# Patient Record
Sex: FEMALE | Race: WHITE
[De-identification: names, ages, dates, MRNs, and addresses within clinical notes are randomized per-mention and may not be internally consistent; named-entity substitution may affect disease eponyms.]

---

## 2018-01-14 ENCOUNTER — HOSPITAL ENCOUNTER (EMERGENCY)
Dept: HOSPITAL 56 - MW.ED | Age: 48
Discharge: HOME | End: 2018-01-14
Payer: COMMERCIAL

## 2018-01-14 DIAGNOSIS — Z79.82: ICD-10-CM

## 2018-01-14 DIAGNOSIS — F17.210: ICD-10-CM

## 2018-01-14 DIAGNOSIS — I82.402: Primary | ICD-10-CM

## 2018-01-14 DIAGNOSIS — E11.9: ICD-10-CM

## 2018-01-14 DIAGNOSIS — Z88.8: ICD-10-CM

## 2018-01-14 LAB
CHLORIDE SERPL-SCNC: 104 MMOL/L (ref 98–110)
SODIUM SERPL-SCNC: 140 MMOL/L (ref 136–146)

## 2018-01-14 PROCEDURE — 85025 COMPLETE CBC W/AUTO DIFF WBC: CPT

## 2018-01-14 PROCEDURE — 99283 EMERGENCY DEPT VISIT LOW MDM: CPT

## 2018-01-14 PROCEDURE — 93971 EXTREMITY STUDY: CPT

## 2018-01-14 PROCEDURE — 85610 PROTHROMBIN TIME: CPT

## 2018-01-14 PROCEDURE — 80053 COMPREHEN METABOLIC PANEL: CPT

## 2018-01-14 PROCEDURE — 36415 COLL VENOUS BLD VENIPUNCTURE: CPT

## 2018-01-14 NOTE — EDM.PDOC
ED HPI GENERAL MEDICAL PROBLEM





- General


Chief Complaint: Lower Extremity Injury/Pain


Stated Complaint: POSSIBLE BLOT CLOT IN LT LEG


Time Seen by Provider: 18 15:45


Source of Information: Reports: Patient


History Limitations: Reports: No Limitations





- History of Present Illness


INITIAL COMMENTS - FREE TEXT/NARRATIVE: 


HISTORY AND PHYSICAL:





History of present illness:


[Patient comes to the emergency room complaining of left lower extremity pain. 

Left calf and lower leg have been feeling tight for the past 5 days but pain 

and swelling began this morning. She complains of warmth and pain to her left 

calf. Describes it as an aching feeling with some sharp shooting pains. Has a 

history of type 2 diabetes for several years. Stopped all of her medications 

about 6 months ago. Smokes one pack of cigarettes per day. No history of 

bleeding or clotting disorders.


No fever or chills. No pain in her right lower extremity. No chest pain 

shortness of breath or difficulty breathing. She has no other complaints or 

concerns at this time. ]





Review of systems: 


As per history of present illness and below otherwise all systems reviewed and 

negative.





Past medical history: 


As per history of present illness and as reviewed below otherwise 

noncontributory.





Surgical history: 


As per history of present illness and as reviewed below otherwise 

noncontributory.





Social history: 


No reported history of drug or alcohol abuse.





Family history: 


As per history of present illness and as reviewed below otherwise 

noncontributory.





Physical exam:


HEENT: Atraumatic, normocephalic.


Lungs: Clear to auscultation, breath sounds equal bilaterally.


Heart: S1S2, regular rate and rhythm.


Extremities: Positive Homans on the left. Tender with palpation behind left 

knee over the popliteal space. Calf is tender with palpation. No cords are 

palpated. Swelling is apparent to left ankle and foot. Cap refill less than 2 

seconds. Neurovascular unremarkable.


Neuro: Awake, alert, oriented.  Motor and sensory unremarkable throughout. Exam 

nonfocal.





Diagnostics:


[CBC, CMP, venous Doppler ultrasound left lower extremity]





Therapeutics:


[Xarelto 15mg po x1]





Impression: 


[DVT Left LE]





Plan:


[Discussed with patient that she has a DVT to her left lower extremity. Urged 

her to quit smoking. Stop aspirin. She may use Tylenol for discomfort. Keep leg 

elevated. She is given her first dose of Xarelto 15 mg in the emergency room 

and sent home with an Rx for xarelto 15mg (#42) si po BID x 21 days 0 RF's. 

Urged patient to follow-up with PCP in the next 48 hours. She verbalized 

understanding of today's discussion and is in agreement with today's plan.]





Definitive disposition and diagnosis as appropriate pending reevaluation and 

review of above.








  ** left lower leg


Pain Score (Numeric/FACES): 8





- Related Data


 Allergies











Allergy/AdvReac Type Severity Reaction Status Date / Time


 


diphenhydramine Allergy  Anaphylactic Verified 18 14:54





[From Benadryl]   Shock  











Home Meds: 


 Home Meds





Aspirin 81 mg PO DAILY 18 [History]











Past Medical History


HEENT History: Reports: None


Cardiovascular History: Reports: None


Respiratory History: Reports: None


Gastrointestinal History: Reports: None


Genitourinary History: Reports: None


OB/GYN History: Reports: None


Musculoskeletal History: Reports: None


Neurological History: Reports: None


Psychiatric History: Reports: None


Endocrine/Metabolic History: Reports: Diabetes, Type II


Hematologic History: Reports: None


Immunologic History: Reports: None


Oncologic (Cancer) History: Reports: None


Dermatologic History: Reports: None





- Past Surgical History


Head Surgeries/Procedures: Reports: None


HEENT Surgical History: Reports: None


Cardiovascular Surgical History: Reports: None


Respiratory Surgical History: Reports: None


GI Surgical History: Reports: None


Female  Surgical History: Reports: Breast Reduction


Endocrine Surgical History: Reports: None


Neurological Surgical History: Reports: None


Musculoskeletal Surgical History: Reports: Other (See Below)


Oncologic Surgical History: Reports: None


Dermatological Surgical History: Reports: None





Social & Family History





- Family History


Family Medical History: Noncontributory





- Tobacco Use


Smoking Status *Q: Current Every Day Smoker


Years of Tobacco use: 35


Packs/Tins Daily: 1





- Caffeine Use


Caffeine Use: Reports: None





- Recreational Drug Use


Recreational Drug Use: No





Review of Systems





- Review of Systems


Review Of Systems: ROS reveals no pertinent complaints other than HPI.





ED EXAM, GENERAL





- Physical Exam


Exam: See Below





Course





- Vital Signs


Last Recorded V/S: 


 Last Vital Signs











Temp  96.8 F   18 14:55


 


Pulse  82   18 18:15


 


Resp  18   18 18:15


 


BP  175/93 H  18 18:15


 


Pulse Ox  96   18 18:15














- Orders/Labs/Meds


Orders: 


 Active Orders 24 hr











 Category Date Time Status


 


 Venous Doppler Lwr Ext Lt [US] Stat Exams  18 15:47 Taken











Labs: 


 Laboratory Tests











  18 Range/Units





  15:53 15:53 15:53 


 


WBC  6.89    (4.0-11.0)  K/uL


 


RBC  4.84    (4.30-5.90)  M/uL


 


Hgb  16.2 H    (12.0-16.0)  g/dL


 


Hct  46.0    (36.0-46.0)  %


 


MCV  95.0    (80.0-98.0)  fL


 


MCH  33.5 H    (27.0-32.0)  pg


 


MCHC  35.2    (31.0-37.0)  g/dL


 


RDW Std Deviation  43.3    (28.0-62.0)  fl


 


RDW Coeff of Kristi  13    (11.0-15.0)  %


 


Plt Count  191    (150-400)  K/uL


 


MPV  9.50    (7.40-12.00)  fL


 


Neut % (Auto)  73.0    (48.0-80.0)  %


 


Lymph % (Auto)  17.7    (16.0-40.0)  %


 


Mono % (Auto)  7.0    (0.0-15.0)  %


 


Eos % (Auto)  2.2    (0.0-7.0)  %


 


Baso % (Auto)  0.1    (0.0-1.5)  %


 


Neut # (Auto)  5.0    (1.4-5.7)  K/uL


 


Lymph # (Auto)  1.2    (0.6-2.4)  K/uL


 


Mono # (Auto)  0.5    (0.0-0.8)  K/uL


 


Eos # (Auto)  0.2    (0.0-0.7)  K/uL


 


Baso # (Auto)  0.0    (0.0-0.1)  K/uL


 


Nucleated RBC %  0.0    /100WBC


 


Nucleated RBCs #  0    K/uL


 


INR    0.99  (0.86-1.11)  


 


Sodium   140   (136-146)  mmol/L


 


Potassium   4.2   (3.5-5.1)  mmol/L


 


Chloride   104   ()  mmol/L


 


Carbon Dioxide   23   (21-31)  mmol/L


 


BUN   7   (6.0-23.0)  mg/dL


 


Creatinine   0.8   (0.6-1.5)  mg/dL


 


Est Cr Clr Drug Dosing   84.54   mL/min


 


Estimated GFR (MDRD)   > 60.0   ml/min


 


Glucose   296 H   ()  mg/dL


 


Calcium   9.4   (8.8-10.8)  mg/dL


 


Total Bilirubin   0.5   (0.1-1.5)  mg/dL


 


AST   52 H   (5-40)  IU/L


 


ALT   26   (8-54)  IU/L


 


Alkaline Phosphatase   114   ()  


 


Total Protein   7.5   (6.0-8.0)  g/dL


 


Albumin   4.2   (3.5-5.0)  g/dL


 


Globulin   3.3   (2.0-3.5)  g/dL


 


Albumin/Globulin Ratio   1.3   (1.3-2.8)  











Meds: 


Medications














Discontinued Medications














Generic Name Dose Route Start Last Admin





  Trade Name Simon  PRN Reason Stop Dose Admin


 


Rivaroxaban  15 mg  01/15/18 17:30  18 18:12





  Xarelto  PO   15 mg





  WITHDINNER DAVID   Administration


 


Rivaroxaban  Confirm  18 17:48  18 18:12





  Xarelto  Administered  18 17:49  Not Given





  Dose   





  15 mg   





  .ROUTE   





  .STK-MED ONE   














Departure





- Departure


Time of Disposition: 17:45


Disposition: Home, Self-Care 01


Condition: Good


Clinical Impression: 


 DVT (deep venous thrombosis)








- Discharge Information


Instructions:  Deep Vein Thrombosis


Referrals: 


PCP,None [Primary Care Provider] - 


Alessandro Espinoza MD [Resident] - 


Forms:  ED Department Discharge


Additional Instructions: 


The following information is given to patients seen in the emergency department 

who are being discharged to home. This information is to outline your options 

for follow-up care. We provide all patients seen in our emergency department 

with a follow-up referral.





The need for follow-up, as well as the timing and circumstances, are variable 

depending upon the specifics of your emergency department visit.





If you don't have a primary care physician on staff, we will provide you with a 

referral. We always advise you to contact your personal physician following an 

emergency department visit to inform them of the circumstance of the visit and 

for follow-up with them and/or the need for any referrals to a consulting 

specialist.





The emergency department will also refer you to a specialist when appropriate. 

This referral assures that you have the opportunity for follow-up care with a 

specialist. All of these measure are taken in an effort to provide you with 

optimal care, which includes your follow-up.





Under all circumstances we always encourage you to contact your private 

physician who remains a resource for coordinating your care. When calling for 

follow-up care, please make the office aware that this follow-up is from your 

recent emergency room visit. If for any reason you are refused follow-up, 

please contact the [facility]  emergency 

department at [phone] and asked to speak to the emergency department 

charge nurse.








[facility]


Primary Care


[address] [place] [id number]


Phone: [phone]


Fax: [phone]








Establish care with a local primary care provider at the clinic listed above 

follow-up there in the next 48 hours.


Take medications as prescribed.


Quit smoking.


Return to ER as needed as discussed.





- My Orders


Last 24 Hours: 


My Active Orders





18 15:47


Venous Doppler Lwr Ext Lt [US] Stat 














- Assessment/Plan


Last 24 Hours: 


My Active Orders





18 15:47


Venous Doppler Lwr Ext Lt [US] Stat

## 2018-01-15 NOTE — US
EXAM DATE: 18



PATIENT'S AGE: 47



Patient: MAU VICENTE



Facility: [place], [place]

Patient ID: [id number]

Site Patient ID: [id number].

Site Accession #: [id number].

: 1970

Study: US Extremity Venous [id number]/14/2018 4:52:50 PM

Ordering Physician: Doctor Temp



Final Report: 

HISTORY:

Left lower extremity pain.



TECHNIQUE:

Grayscale and color and spectral Doppler ultrasound of the left lower extremity 
deep veins.



COMPARISON:

None.



FINDINGS:

Left: Common femoral vein is patent and compressible. Femoral and deep femoral 
veins are patent and compressible. Occlusive hypoechoic thrombus in the 
popliteal, posterior tibial, and anterior tibial veins. 

Peroneal vein is not visualized.



Greater saphenous vein is patent and compressible at the junction with the 
femoral vein.



IMPRESSION:

DVT in the left popliteal, posterior tibial, and anterior tibial veins.



Communicated with Dr. Sheridan on 18 at 1725 hours.



Dictated by Velasquez Plaza MD @ 2018 5:29PM

(Electronic Signature)



Report Signed by Proxy.
[facility] HospitalNOLAN

## 2022-05-11 ENCOUNTER — HOSPITAL ENCOUNTER (EMERGENCY)
Dept: HOSPITAL 56 - MW.ED | Age: 52
Discharge: HOME | End: 2022-05-11
Payer: SELF-PAY

## 2022-05-11 DIAGNOSIS — Z88.8: ICD-10-CM

## 2022-05-11 DIAGNOSIS — Z72.0: ICD-10-CM

## 2022-05-11 DIAGNOSIS — Z79.899: ICD-10-CM

## 2022-05-11 DIAGNOSIS — I10: ICD-10-CM

## 2022-05-11 DIAGNOSIS — J44.9: ICD-10-CM

## 2022-05-11 DIAGNOSIS — R07.9: Primary | ICD-10-CM

## 2022-05-11 DIAGNOSIS — Z79.84: ICD-10-CM

## 2022-05-11 DIAGNOSIS — E11.9: ICD-10-CM

## 2022-05-11 LAB
BUN SERPL-MCNC: 9 MG/DL (ref 7–18)
CHLORIDE SERPL-SCNC: 102 MMOL/L (ref 98–107)
CO2 SERPL-SCNC: 26.4 MMOL/L (ref 21–32)
GLUCOSE SERPL-MCNC: 165 MG/DL (ref 74–106)
POTASSIUM SERPL-SCNC: 3.8 MMOL/L (ref 3.5–5.1)
SODIUM SERPL-SCNC: 141 MMOL/L (ref 136–145)